# Patient Record
Sex: MALE | Race: WHITE | Employment: FULL TIME | URBAN - METROPOLITAN AREA
[De-identification: names, ages, dates, MRNs, and addresses within clinical notes are randomized per-mention and may not be internally consistent; named-entity substitution may affect disease eponyms.]

---

## 2018-05-30 ENCOUNTER — HOSPITAL ENCOUNTER (OUTPATIENT)
Age: 48
Discharge: HOME | End: 2018-05-30
Attending: FAMILY MEDICINE
Payer: COMMERCIAL

## 2018-05-30 ENCOUNTER — APPOINTMENT (OUTPATIENT)
Dept: GENERAL RADIOLOGY | Age: 48
End: 2018-05-30
Attending: FAMILY MEDICINE
Payer: COMMERCIAL

## 2018-05-30 VITALS
BODY MASS INDEX: 26.28 KG/M2 | RESPIRATION RATE: 18 BRPM | OXYGEN SATURATION: 98 % | HEART RATE: 72 BPM | WEIGHT: 194 LBS | HEIGHT: 72 IN | SYSTOLIC BLOOD PRESSURE: 146 MMHG | DIASTOLIC BLOOD PRESSURE: 84 MMHG | TEMPERATURE: 98 F

## 2018-05-30 DIAGNOSIS — M62.830 SPASM OF MUSCLE OF LOWER BACK: ICD-10-CM

## 2018-05-30 DIAGNOSIS — M54.32 SCIATICA OF LEFT SIDE: Primary | ICD-10-CM

## 2018-05-30 PROCEDURE — 96372 THER/PROPH/DIAG INJ SC/IM: CPT

## 2018-05-30 PROCEDURE — 72110 X-RAY EXAM L-2 SPINE 4/>VWS: CPT | Performed by: FAMILY MEDICINE

## 2018-05-30 PROCEDURE — 99204 OFFICE O/P NEW MOD 45 MIN: CPT

## 2018-05-30 RX ORDER — BUPRENORPHINE HYDROCHLORIDE AND NALOXONE HYDROCHLORIDE DIHYDRATE 8; 2 MG/1; MG/1
1 TABLET SUBLINGUAL DAILY
COMMUNITY
End: 2018-07-06

## 2018-05-30 RX ORDER — KETOROLAC TROMETHAMINE 30 MG/ML
60 INJECTION, SOLUTION INTRAMUSCULAR; INTRAVENOUS ONCE
Status: COMPLETED | OUTPATIENT
Start: 2018-05-30 | End: 2018-05-30

## 2018-05-30 RX ORDER — ALPRAZOLAM 0.25 MG/1
0.25 TABLET ORAL NIGHTLY PRN
COMMUNITY
End: 2019-07-26 | Stop reason: ALTCHOICE

## 2018-05-30 RX ORDER — DIAZEPAM 2 MG/1
2 TABLET ORAL ONCE
Status: COMPLETED | OUTPATIENT
Start: 2018-05-30 | End: 2018-05-30

## 2018-05-30 RX ORDER — METAXALONE 800 MG/1
800 TABLET ORAL 3 TIMES DAILY
Qty: 15 TABLET | Refills: 0 | Status: SHIPPED | OUTPATIENT
Start: 2018-05-30 | End: 2018-06-04

## 2018-05-30 RX ORDER — PREDNISONE 10 MG/1
TABLET ORAL
Qty: 30 TABLET | Refills: 0 | Status: SHIPPED | OUTPATIENT
Start: 2018-05-30 | End: 2018-06-08

## 2018-05-30 NOTE — ED INITIAL ASSESSMENT (HPI)
Denies injury pain to left lower back on & off x 6 months, now numbness to left leg, denies loss of bowel or bladder.

## 2018-05-30 NOTE — ED NOTES
Decreased pain now 4/10 but says pain increases w weight bearing. Dr Mine Sen aware. Distal pulse & cap refill remain WNL.

## 2018-05-30 NOTE — ED PROVIDER NOTES
Patient Seen in: [facility]    History   Patient presents with:  Back Pain (musculoskeletal)    Stated Complaint: back pain, leg pain x2 days    HPI  31-year-old gentleman with no prior back injury back surgery, has had april Pulse 72   Temp 97.9 °F (36.6 °C) (Temporal)   Resp 18   Ht 182.9 cm (6')   Wt 88 kg   SpO2 98%   BMI 26.31 kg/m²         Physical Exam  General: Well-nourished, well hydrated.  In mod distress associated with pain in the L lower back and buttock region rad Sig: Day 1,2: 50mg/5pills, Day 3,4: 40 mg/4 pills, Day 5,6: 30mg/3 pills, Day 7,8: 20mg/ 2 pills, Day 9,10: 10 mg/1 pill once daily          Dispense:  30 tablet          Refill:  0  Xr Lumbar Spine (min 4 Views) (cpt=72110)    Result Date: 5/30/2018  AZ disc herniation appreciated on the XR   You may need an MRI for further evaluation  You will benefit from Physical therapy after this flare subsides   Avoid any strenuous activity, lifting or pushing or pulling at this time   Avoid pressure on the L buttoc

## 2018-06-20 ENCOUNTER — HOSPITAL ENCOUNTER (OUTPATIENT)
Age: 48
Discharge: HOME | End: 2018-06-20
Attending: FAMILY MEDICINE
Payer: COMMERCIAL

## 2018-06-20 VITALS
OXYGEN SATURATION: 96 % | RESPIRATION RATE: 18 BRPM | WEIGHT: 202 LBS | HEART RATE: 80 BPM | TEMPERATURE: 98 F | BODY MASS INDEX: 27.36 KG/M2 | HEIGHT: 72 IN | DIASTOLIC BLOOD PRESSURE: 67 MMHG | SYSTOLIC BLOOD PRESSURE: 126 MMHG

## 2018-06-20 DIAGNOSIS — M54.16 LEFT LUMBAR RADICULITIS: Primary | ICD-10-CM

## 2018-06-20 PROCEDURE — 99214 OFFICE O/P EST MOD 30 MIN: CPT

## 2018-06-20 PROCEDURE — 96372 THER/PROPH/DIAG INJ SC/IM: CPT

## 2018-06-20 RX ORDER — KETOROLAC TROMETHAMINE 30 MG/ML
60 INJECTION, SOLUTION INTRAMUSCULAR; INTRAVENOUS ONCE
Status: COMPLETED | OUTPATIENT
Start: 2018-06-20 | End: 2018-06-20

## 2018-06-20 RX ORDER — CARISOPRODOL 250 MG/1
1 TABLET ORAL 3 TIMES DAILY
Qty: 10 TABLET | Refills: 0 | Status: SHIPPED | OUTPATIENT
Start: 2018-06-20 | End: 2018-06-24

## 2018-06-20 RX ORDER — PREDNISONE 10 MG/1
TABLET ORAL
Qty: 20 TABLET | Refills: 0 | Status: SHIPPED | OUTPATIENT
Start: 2018-06-20 | End: 2018-06-27 | Stop reason: ALTCHOICE

## 2018-06-20 NOTE — ED INITIAL ASSESSMENT (HPI)
C/o worsening left low back pain with radiation down left leg. Denies bowel or bladder habits. States he was here a few weeks ago, had treatment with prednisone, muscle relaxers. He reports he has not gotten relief, and feels it is worse.     Reports he

## 2018-06-21 ENCOUNTER — HOSPITAL ENCOUNTER (OUTPATIENT)
Dept: MRI IMAGING | Age: 48
Discharge: HOME | End: 2018-06-21
Attending: SPECIALIST
Payer: COMMERCIAL

## 2018-06-21 DIAGNOSIS — M54.16 LUMBAR RADICULOPATHY: ICD-10-CM

## 2018-06-21 PROCEDURE — 72148 MRI LUMBAR SPINE W/O DYE: CPT | Performed by: SPECIALIST

## 2018-06-21 NOTE — ED PROVIDER NOTES
Patient Seen in: [address] [facility]    History   Patient presents with:  Back Pain (musculoskeletal)    Stated Complaint: back pain    HPI    42-year-old male presents to the immediate care today with chief complaints of worsening atraumatic. PERRL bilaterally. Sclera clear and non icteric bilaterally. Tonsils are clear no exudates bilaterally. Moist mucous membranes. Neck: supple. No adenopathy. No thyromegaly. Heart: RRR without S3 or S4 murmur .  Clear S1S2  Lungs: clear to [employer] (three) times daily. , Print, Disp-10 tablet, R-0    predniSONE 10 MG Oral Tab  Take 3 tablets by mouth once a day for 3 days, then 2 tablets by mouth once a day for 3 days, then 1 tablet by mouth once a day for 3 days, Normal, Disp-20 tablet, R-0

## 2018-06-27 ENCOUNTER — OFFICE VISIT (OUTPATIENT)
Dept: SURGERY | Facility: CLINIC | Age: 48
End: 2018-06-27

## 2018-06-27 ENCOUNTER — TELEPHONE (OUTPATIENT)
Dept: SURGERY | Facility: CLINIC | Age: 48
End: 2018-06-27

## 2018-06-27 VITALS
SYSTOLIC BLOOD PRESSURE: 122 MMHG | WEIGHT: 211 LBS | HEART RATE: 70 BPM | DIASTOLIC BLOOD PRESSURE: 76 MMHG | BODY MASS INDEX: 28.58 KG/M2 | HEIGHT: 72 IN | OXYGEN SATURATION: 96 %

## 2018-06-27 DIAGNOSIS — M54.16 LUMBAR RADICULITIS: Primary | ICD-10-CM

## 2018-06-27 PROCEDURE — 99204 OFFICE O/P NEW MOD 45 MIN: CPT | Performed by: NURSE PRACTITIONER

## 2018-06-27 RX ORDER — ERGOCALCIFEROL 1.25 MG/1
CAPSULE ORAL
Refills: 3 | Status: ON HOLD | COMMUNITY
Start: 2018-06-15 | End: 2018-08-21

## 2018-06-27 RX ORDER — RISPERIDONE 120 MG
1 KIT SUBCUTANEOUS 2 TIMES DAILY
Refills: 0 | Status: ON HOLD | COMMUNITY
Start: 2018-06-23 | End: 2018-08-22

## 2018-06-27 RX ORDER — GABAPENTIN 400 MG/1
CAPSULE ORAL
Refills: 1 | Status: ON HOLD | COMMUNITY
Start: 2018-06-23 | End: 2018-08-21

## 2018-06-27 RX ORDER — CARISOPRODOL 350 MG/1
350 TABLET ORAL 3 TIMES DAILY PRN
Refills: 1 | COMMUNITY
End: 2019-07-26 | Stop reason: ALTCHOICE

## 2018-06-27 NOTE — PATIENT INSTRUCTIONS
Refill policies:    • Allow 2-3 business days for refills; controlled substances may take longer.   • Contact your pharmacy at least 5 days prior to running out of medication and have them send an electronic request or submit request through the “request re entire amount billed. Precertification and Prior Authorizations: If your physician has recommended that you have a procedure or additional testing performed.   [facility]) will contact your insurance carrier to obtain pre-certi not authorize routine medications on weekends. • No narcotics or controlled substances are refilled after noon on Fridays or by on call physicians. • By law, narcotics cannot be faxed or phoned into your pharmacy.  The prescription must be signed by the p the procedure/test has been pre-certified. You are strongly encouraged to contact your insurance carrier to verify that your procedure/test has been approved and is a COVERED benefit.   Although the [facility] staff does its due diligence, the insurance carrier g prior to your procedure to reduce the risk of  infection. Day of Procedure:   Do not eat or drink anything (including water) 6 hours prior to your procedure.   • If you take morning blood pressure medication or oral diabetic medication, please take wit days  • Others 7 days   NSAIDs: 24 hours    • Ibuprofen (Motrin, Advil, Vicoprofen), Naproxen (Naprosyn, Aleve), Piroxcam (Feldene), Meloxicam (Mobic), Oxaprozin (Daypro), Diclofenac (Voltaren),  Indomethacin (Indocin), Etodolac (Lodine), Nabumetone (Relaf

## 2018-06-27 NOTE — PROGRESS NOTES
HPI:    Patient ID: Johnathan Stauffer is a 56year old male.     HPI    Review of Systems         Current Outpatient Prescriptions:  SUBOXONE 8-2 MG Sublingual Film  Disp:  Rfl: 0   Carisoprodol 350 MG Oral Tab  Disp:  Rfl: 1   ergocalciferol 52123 units Oral Cap Prior diagnostic testing for your pain:  MRI

## 2018-06-27 NOTE — H&P
Name: Brittani Whitehead   : 1970   DOS: 2018     Chief complaint: Patient presents with:  New Patient: Pt. reports left sided low back, buttocks, sciatica into left leg and sometimes toes. /10 Current pain. Other: with Maricruz, ok to hear PHI. Smokeless tobacco: Never Used                      Alcohol use:  No                Review of  other systems:  Constitutional: negative for fever, weight loss and malaise/fatigue  Cardiovascular:  Denies shortness of breath, chest pain, dyspnea on exertion spine.  Marrow signal is unremarkable. The conus is at L1. The visualized portion of the spinal cord is of normal caliber without focal signal abnormality. T12-L1: No disc herniation, spinal canal or neuroforaminal stenosis.   L1-L2: No disc herniation marijuana and opioid abuse in the past with current suboxone use. If he gets some relief from the injection but still has radiating symptoms we can start him on gabapentin. 3) F/U in the office 2 weeks post procedures for re-evaluation.        Orders:No o

## 2018-07-02 ENCOUNTER — HOSPITAL ENCOUNTER (OUTPATIENT)
Facility: HOSPITAL | Age: 48
Setting detail: HOSPITAL OUTPATIENT SURGERY
Discharge: HOME | End: 2018-07-02
Attending: ANESTHESIOLOGY | Admitting: ANESTHESIOLOGY
Payer: COMMERCIAL

## 2018-07-02 ENCOUNTER — SURGERY (OUTPATIENT)
Age: 48
End: 2018-07-02

## 2018-07-02 ENCOUNTER — APPOINTMENT (OUTPATIENT)
Dept: GENERAL RADIOLOGY | Facility: HOSPITAL | Age: 48
End: 2018-07-02
Attending: ANESTHESIOLOGY
Payer: COMMERCIAL

## 2018-07-02 ENCOUNTER — TELEPHONE (OUTPATIENT)
Dept: SURGERY | Facility: CLINIC | Age: 48
End: 2018-07-02

## 2018-07-02 VITALS
OXYGEN SATURATION: 99 % | TEMPERATURE: 98 F | HEART RATE: 76 BPM | RESPIRATION RATE: 18 BRPM | SYSTOLIC BLOOD PRESSURE: 177 MMHG | DIASTOLIC BLOOD PRESSURE: 128 MMHG

## 2018-07-02 DIAGNOSIS — M54.16 LUMBAR RADICULITIS: ICD-10-CM

## 2018-07-02 PROCEDURE — 3E0R3KZ INTRODUCTION OF OTHER DIAGNOSTIC SUBSTANCE INTO SPINAL CANAL, PERCUTANEOUS APPROACH: ICD-10-PCS | Performed by: ANESTHESIOLOGY

## 2018-07-02 PROCEDURE — 3E0R33Z INTRODUCTION OF ANTI-INFLAMMATORY INTO SPINAL CANAL, PERCUTANEOUS APPROACH: ICD-10-PCS | Performed by: ANESTHESIOLOGY

## 2018-07-02 PROCEDURE — 99152 MOD SED SAME PHYS/QHP 5/>YRS: CPT | Performed by: ANESTHESIOLOGY

## 2018-07-02 RX ORDER — LIDOCAINE HYDROCHLORIDE 10 MG/ML
INJECTION, SOLUTION EPIDURAL; INFILTRATION; INTRACAUDAL; PERINEURAL AS NEEDED
Status: DISCONTINUED | OUTPATIENT
Start: 2018-07-02 | End: 2018-07-02 | Stop reason: HOSPADM

## 2018-07-02 RX ORDER — DIAZEPAM 5 MG/ML
5 INJECTION, SOLUTION INTRAMUSCULAR; INTRAVENOUS ONCE
Status: DISCONTINUED | OUTPATIENT
Start: 2018-07-02 | End: 2018-07-02

## 2018-07-02 RX ORDER — DIPHENHYDRAMINE HYDROCHLORIDE 50 MG/ML
50 INJECTION INTRAMUSCULAR; INTRAVENOUS ONCE AS NEEDED
Status: CANCELLED | OUTPATIENT
Start: 2018-07-02 | End: 2018-07-02

## 2018-07-02 RX ORDER — DEXAMETHASONE SODIUM PHOSPHATE 10 MG/ML
INJECTION, SOLUTION INTRAMUSCULAR; INTRAVENOUS AS NEEDED
Status: DISCONTINUED | OUTPATIENT
Start: 2018-07-02 | End: 2018-07-02 | Stop reason: HOSPADM

## 2018-07-02 RX ORDER — SODIUM CHLORIDE, SODIUM LACTATE, POTASSIUM CHLORIDE, CALCIUM CHLORIDE 600; 310; 30; 20 MG/100ML; MG/100ML; MG/100ML; MG/100ML
100 INJECTION, SOLUTION INTRAVENOUS CONTINUOUS
Status: DISCONTINUED | OUTPATIENT
Start: 2018-07-02 | End: 2018-07-02

## 2018-07-02 RX ORDER — ACETAMINOPHEN 500 MG
TABLET ORAL
Status: COMPLETED
Start: 2018-07-02 | End: 2018-07-02

## 2018-07-02 RX ORDER — ONDANSETRON 2 MG/ML
4 INJECTION INTRAMUSCULAR; INTRAVENOUS ONCE AS NEEDED
Status: CANCELLED | OUTPATIENT
Start: 2018-07-02 | End: 2018-07-02

## 2018-07-02 RX ORDER — DIAZEPAM 5 MG/1
TABLET ORAL
Status: DISCONTINUED
Start: 2018-07-02 | End: 2018-07-02 | Stop reason: WASHOUT

## 2018-07-02 RX ORDER — 0.9 % SODIUM CHLORIDE 0.9 %
VIAL (ML) INJECTION AS NEEDED
Status: DISCONTINUED | OUTPATIENT
Start: 2018-07-02 | End: 2018-07-02 | Stop reason: HOSPADM

## 2018-07-02 RX ORDER — MIDAZOLAM HYDROCHLORIDE 1 MG/ML
INJECTION INTRAMUSCULAR; INTRAVENOUS AS NEEDED
Status: DISCONTINUED | OUTPATIENT
Start: 2018-07-02 | End: 2018-07-02 | Stop reason: HOSPADM

## 2018-07-02 NOTE — H&P
History & Physical Examination    Patient Name: Matt Torres  MRN: [id number]  CSN: [id number]  YOB: 1970    Pre-Operative Diagnosis:  Lumbar radiculitis [M54.16]    Present Illness: Patient with right lumbar radiculopathy here for transforamin days.  Any changes noted above.     Margareth Meng MD

## 2018-07-02 NOTE — OPERATIVE REPORT
[facility]  Operative Report  2018     Kiko Coleman Patient Status:  Hospital Outpatient Surgery    1970 MRN [id number]   [facility] SURGERY Attending Freida Barney MD   Hosp Day # 0 PCP Leeanna Mccarty MD     Indication: Quita Morales is epidural space at this level. The needle position was confirmed under AP and lateral fluoroscopic view. Following negative aspiration for CSF and blood, approximately 1 cc of Omnipaque 240 was injected.   An excellent contrast spread along the epidural spa

## 2018-07-06 ENCOUNTER — OFFICE VISIT (OUTPATIENT)
Dept: SURGERY | Facility: CLINIC | Age: 48
End: 2018-07-06

## 2018-07-06 VITALS
HEIGHT: 72 IN | WEIGHT: 210 LBS | DIASTOLIC BLOOD PRESSURE: 80 MMHG | BODY MASS INDEX: 28.44 KG/M2 | SYSTOLIC BLOOD PRESSURE: 120 MMHG | HEART RATE: 88 BPM

## 2018-07-06 DIAGNOSIS — M48.061 LUMBAR FORAMINAL STENOSIS: ICD-10-CM

## 2018-07-06 DIAGNOSIS — M54.16 LUMBAR RADICULOPATHY: Primary | ICD-10-CM

## 2018-07-06 PROCEDURE — 99204 OFFICE O/P NEW MOD 45 MIN: CPT | Performed by: PHYSICIAN ASSISTANT

## 2018-07-06 NOTE — PROGRESS NOTES
Location of Pain: left sided low back, buttocks, sciatica into left leg and sometimes toes.      Date Pain Began: May 30 2018          Work Related:   No        Receiving Work Comp/Disability:   No    Numeric Rating Scale:  Pain at Present:  7

## 2018-07-06 NOTE — H&P
Neurosurgery Clinic Visit  2018    Dilma Jones PCP:  Corrine Davidson MD    1970 MRN [id number]       CC:  Left Leg Pain    HPI:    Jasmeet Gallardo is a very pleasant 56year old male who presents with 2 months of left leg pain.   He woke up with the pain, n spine.  Marginal osteophytes are noted. Mild facet hypertrophic changes are noted. No past medical history on file.   Social History    Marital status: Single              Spouse name:                       Years of education:                 Number of Lower extremity strength:    Iliopsoas Quad Hamstring D-Flexion EHL P-Flexion Eversion Inversion   Right 5 5 5 5 5 5 5 5   Left 5 5* 5 5 5 5 5 5     *Difficulty at first, but able to give good resistance through the pain when prompted    A/P:    1.  Veronica Shepard

## 2018-07-10 ENCOUNTER — HOSPITAL ENCOUNTER (OUTPATIENT)
Dept: GENERAL RADIOLOGY | Age: 48
Discharge: HOME | End: 2018-07-10
Attending: PHYSICIAN ASSISTANT
Payer: COMMERCIAL

## 2018-07-10 DIAGNOSIS — M48.061 LUMBAR FORAMINAL STENOSIS: ICD-10-CM

## 2018-07-10 DIAGNOSIS — M54.16 LUMBAR RADICULOPATHY: ICD-10-CM

## 2018-07-10 PROCEDURE — 72114 X-RAY EXAM L-S SPINE BENDING: CPT | Performed by: PHYSICIAN ASSISTANT

## 2018-07-11 ENCOUNTER — TELEPHONE (OUTPATIENT)
Dept: SURGERY | Facility: CLINIC | Age: 48
End: 2018-07-11

## 2018-07-11 NOTE — TELEPHONE ENCOUNTER
Left message for patient, confirmed procedure date of 07/16 and to be checked in at outpatient registration at 8:45 am. Patient instructed to call pre-procedure line before procedure at [phone].  Patient instructed to call office if there are additiona

## 2018-07-16 ENCOUNTER — SURGERY (OUTPATIENT)
Age: 48
End: 2018-07-16

## 2018-07-16 ENCOUNTER — HOSPITAL ENCOUNTER (OUTPATIENT)
Facility: HOSPITAL | Age: 48
Setting detail: HOSPITAL OUTPATIENT SURGERY
Discharge: HOME | End: 2018-07-16
Attending: ANESTHESIOLOGY | Admitting: ANESTHESIOLOGY
Payer: COMMERCIAL

## 2018-07-16 ENCOUNTER — APPOINTMENT (OUTPATIENT)
Dept: GENERAL RADIOLOGY | Facility: HOSPITAL | Age: 48
End: 2018-07-16
Attending: ANESTHESIOLOGY
Payer: COMMERCIAL

## 2018-07-16 VITALS
SYSTOLIC BLOOD PRESSURE: 137 MMHG | OXYGEN SATURATION: 95 % | TEMPERATURE: 98 F | RESPIRATION RATE: 18 BRPM | HEART RATE: 66 BPM | DIASTOLIC BLOOD PRESSURE: 88 MMHG

## 2018-07-16 DIAGNOSIS — M54.16 LUMBAR RADICULITIS: ICD-10-CM

## 2018-07-16 PROCEDURE — 3E0R33Z INTRODUCTION OF ANTI-INFLAMMATORY INTO SPINAL CANAL, PERCUTANEOUS APPROACH: ICD-10-PCS | Performed by: ANESTHESIOLOGY

## 2018-07-16 PROCEDURE — 99152 MOD SED SAME PHYS/QHP 5/>YRS: CPT | Performed by: ANESTHESIOLOGY

## 2018-07-16 RX ORDER — MIDAZOLAM HYDROCHLORIDE 1 MG/ML
INJECTION INTRAMUSCULAR; INTRAVENOUS AS NEEDED
Status: DISCONTINUED | OUTPATIENT
Start: 2018-07-16 | End: 2018-07-16 | Stop reason: HOSPADM

## 2018-07-16 RX ORDER — DEXAMETHASONE SODIUM PHOSPHATE 10 MG/ML
INJECTION, SOLUTION INTRAMUSCULAR; INTRAVENOUS AS NEEDED
Status: DISCONTINUED | OUTPATIENT
Start: 2018-07-16 | End: 2018-07-16 | Stop reason: HOSPADM

## 2018-07-16 RX ORDER — ONDANSETRON 2 MG/ML
4 INJECTION INTRAMUSCULAR; INTRAVENOUS ONCE AS NEEDED
Status: DISCONTINUED | OUTPATIENT
Start: 2018-07-16 | End: 2018-07-16

## 2018-07-16 RX ORDER — LIDOCAINE HYDROCHLORIDE 10 MG/ML
INJECTION, SOLUTION EPIDURAL; INFILTRATION; INTRACAUDAL; PERINEURAL AS NEEDED
Status: DISCONTINUED | OUTPATIENT
Start: 2018-07-16 | End: 2018-07-16 | Stop reason: HOSPADM

## 2018-07-16 RX ORDER — DIPHENHYDRAMINE HYDROCHLORIDE 50 MG/ML
50 INJECTION INTRAMUSCULAR; INTRAVENOUS ONCE AS NEEDED
Status: DISCONTINUED | OUTPATIENT
Start: 2018-07-16 | End: 2018-07-16

## 2018-07-16 RX ORDER — 0.9 % SODIUM CHLORIDE 0.9 %
VIAL (ML) INJECTION AS NEEDED
Status: DISCONTINUED | OUTPATIENT
Start: 2018-07-16 | End: 2018-07-16 | Stop reason: HOSPADM

## 2018-07-16 RX ORDER — SODIUM CHLORIDE, SODIUM LACTATE, POTASSIUM CHLORIDE, CALCIUM CHLORIDE 600; 310; 30; 20 MG/100ML; MG/100ML; MG/100ML; MG/100ML
100 INJECTION, SOLUTION INTRAVENOUS CONTINUOUS
Status: DISCONTINUED | OUTPATIENT
Start: 2018-07-16 | End: 2018-07-16

## 2018-07-16 NOTE — OPERATIVE REPORT
[facility]  Operative Report  2018     Dilma Jones Patient Status:  Hospital Outpatient Surgery    1970 MRN [id number]   [facility] SURGERY Attending Timothy Luo MD   Hosp Day # 0 PCP Corrine Davidson MD     Indication: Ruiz Perez epidural space at this level. The needle position was confirmed under AP and lateral fluoroscopic view. Following negative aspiration for CSF and blood, approximately 1 cc of Omnipaque 240 was injected.   An excellent contrast spread along the epidural spa

## 2018-07-16 NOTE — H&P
History & Physical Examination    Patient Name: Dewayne Rodriguez  MRN: [id number]  CSN: [id number]  YOB: 1970    Pre-Operative Diagnosis:  Lumbar radiculitis [M54.16]    Present Illness: Lumbar radiculopathy here for transforaminal epidural steroid plan of care. [ x ] I have reviewed the History and Physical done within the last 30 days. Any changes noted above.     Federica Townsend MD

## 2018-07-25 ENCOUNTER — TELEPHONE (OUTPATIENT)
Dept: SURGERY | Facility: CLINIC | Age: 48
End: 2018-07-25

## 2018-07-25 NOTE — TELEPHONE ENCOUNTER
Spoke to patient, confirmed procedure date of 7/30/18 and to be checked in at outpatient registration at 8:30 am. Patient instructed to call pre-procedure line before procedure at [phone].  Patient instructed to call office if there are additional ques

## 2018-07-30 ENCOUNTER — TELEPHONE (OUTPATIENT)
Dept: PAIN CLINIC | Facility: CLINIC | Age: 48
End: 2018-07-30

## 2018-08-01 ENCOUNTER — OFFICE VISIT (OUTPATIENT)
Dept: SURGERY | Facility: CLINIC | Age: 48
End: 2018-08-01

## 2018-08-01 ENCOUNTER — TELEPHONE (OUTPATIENT)
Dept: SURGERY | Facility: CLINIC | Age: 48
End: 2018-08-01

## 2018-08-01 VITALS — DIASTOLIC BLOOD PRESSURE: 92 MMHG | SYSTOLIC BLOOD PRESSURE: 152 MMHG | RESPIRATION RATE: 18 BRPM | HEART RATE: 68 BPM

## 2018-08-01 DIAGNOSIS — M54.16 LUMBAR RADICULOPATHY: Primary | ICD-10-CM

## 2018-08-01 PROCEDURE — 99213 OFFICE O/P EST LOW 20 MIN: CPT | Performed by: NEUROLOGICAL SURGERY

## 2018-08-01 RX ORDER — DICLOFENAC SODIUM 75 MG/1
1 TABLET, DELAYED RELEASE ORAL 2 TIMES DAILY
Refills: 3 | COMMUNITY
Start: 2018-07-23

## 2018-08-01 NOTE — TELEPHONE ENCOUNTER
You are scheduled for Left Lumbar 4 foraminotomy on 8/21/18 with .     Pre-op instructions discussed with patient and surgical packet provided:     · You will need to contact the Pre-admission department at [phone].  You will speak with a [employer] before you are discharged from the hospital  · Post operative appointment to be made 2 weeks and 6 weeks after surgery. When speaking with Pre-admissions you will be told about a spine class as it relates to your surgery.  Although the class is not jacob

## 2018-08-01 NOTE — TELEPHONE ENCOUNTER
Patient was seen in the office today by Ofelia Mcdonnell. Per Ofelia Mcdonnell he would like patient to be scheduled for a piriformis injections ASAP prior to surgery that is currently scheduled with  for 8/21/18.     Please discuss with  and contact p

## 2018-08-01 NOTE — PROGRESS NOTES
[facility]  Neurosurgery Clinic Visit    HISTORY OF PRESENT ILLNESS:  Sandy Abraham is a(n) 56year old male previously seen in clinic for left leg pain, L L4-5 foraminal stenosis on MRI.   Pain is persistent and severe, limits his activit is intact. Tongue is midline.        Motor: 5/5 BLE   Sensation: intact to light touch bilaterally     Reflexes: 1+, no clonus, no campa's   Coordination: deferred   Gait: deferred  TTP over mid left gluteal region, deep palpation increases the pain

## 2018-08-01 NOTE — PATIENT INSTRUCTIONS
Refill policies:    • Allow 2-3 business days for refills; controlled substances may take longer.   • Contact your pharmacy at least 5 days prior to running out of medication and have them send an electronic request or submit request through the “request re entire amount billed. Precertification and Prior Authorizations: If your physician has recommended that you have a procedure or additional testing performed.   [facility]) will contact your insurance carrier to obtain pre-certi get you scheduled for all your pre-op testing required for your surgery. · You will need  pre operative labs which will include MRSA/MSSA testing. If positive you  will be contacted by our office with further instructions.    · No blood thinning medicatio Pre-op Spine Surgery Class is held at [facility] most Wednesdays from 4:00-5:00 pm.  Call Pre-admission Testing (PAT) to register at:  [phone].     Please park in the [facility] garage, check in at registration and meet by the fish tank in the

## 2018-08-01 NOTE — PROGRESS NOTES
Patient f/u after completing xray and MRI's. Injections 1st injection did not help and 2nd injection helped about 30% for 2-3 days.   No PT.

## 2018-08-01 NOTE — TELEPHONE ENCOUNTER
LM on personalized VM informing patient letter can be sent via [employer]. If he still needs a copy informed patient to call our office back. Informed patient letter is not ready yet as Nancy Duvall still needs to review and sign the letter.     Letter pended an

## 2018-08-02 ENCOUNTER — TELEPHONE (OUTPATIENT)
Dept: PAIN CLINIC | Facility: CLINIC | Age: 48
End: 2018-08-02

## 2018-08-02 DIAGNOSIS — M54.16 LUMBAR RADICULOPATHY: Primary | ICD-10-CM

## 2018-08-02 DIAGNOSIS — M79.18 PIRIFORMIS MUSCLE PAIN: ICD-10-CM

## 2018-08-02 NOTE — TELEPHONE ENCOUNTER
55 yo M with persistent L leg pain, L buttock/hip pain with TTP, L L4 foraminal stenosis.   This is likely due to foraminal stenosis, however, he has findings concerning for pyriformis syndrome as well.       PLAN:  -pyriformis injection with Dr. Jazmin Prasadpen

## 2018-08-02 NOTE — TELEPHONE ENCOUNTER
Spoke with patient and scheduled injection. 8/13 with a check in time of 1430. Reviewed pre-op instructions. Patient verbalized understanding, no further questions at this time. Pre-op instructions sent via [employer].

## 2018-08-02 NOTE — TELEPHONE ENCOUNTER
Periformis injection scheduled. 8/13/18 with a 2:30 check in time. see TE from DR. Betancourt Standing 8/1/18

## 2018-08-13 ENCOUNTER — HOSPITAL ENCOUNTER (OUTPATIENT)
Facility: HOSPITAL | Age: 48
Setting detail: HOSPITAL OUTPATIENT SURGERY
Discharge: HOME | End: 2018-08-13
Attending: ANESTHESIOLOGY | Admitting: ANESTHESIOLOGY
Payer: COMMERCIAL

## 2018-08-13 ENCOUNTER — APPOINTMENT (OUTPATIENT)
Dept: GENERAL RADIOLOGY | Facility: HOSPITAL | Age: 48
End: 2018-08-13
Attending: ANESTHESIOLOGY
Payer: COMMERCIAL

## 2018-08-13 ENCOUNTER — SURGERY (OUTPATIENT)
Age: 48
End: 2018-08-13

## 2018-08-13 VITALS
SYSTOLIC BLOOD PRESSURE: 137 MMHG | RESPIRATION RATE: 18 BRPM | OXYGEN SATURATION: 100 % | TEMPERATURE: 99 F | HEART RATE: 70 BPM | DIASTOLIC BLOOD PRESSURE: 72 MMHG

## 2018-08-13 DIAGNOSIS — M79.18 PIRIFORMIS MUSCLE PAIN: ICD-10-CM

## 2018-08-13 DIAGNOSIS — M54.16 LUMBAR RADICULOPATHY: ICD-10-CM

## 2018-08-13 PROCEDURE — 3E023BZ INTRODUCTION OF ANESTHETIC AGENT INTO MUSCLE, PERCUTANEOUS APPROACH: ICD-10-PCS | Performed by: ANESTHESIOLOGY

## 2018-08-13 PROCEDURE — 3E0233Z INTRODUCTION OF ANTI-INFLAMMATORY INTO MUSCLE, PERCUTANEOUS APPROACH: ICD-10-PCS | Performed by: ANESTHESIOLOGY

## 2018-08-13 PROCEDURE — 77002 NEEDLE LOCALIZATION BY XRAY: CPT | Performed by: ANESTHESIOLOGY

## 2018-08-13 RX ORDER — DIPHENHYDRAMINE HYDROCHLORIDE 50 MG/ML
50 INJECTION INTRAMUSCULAR; INTRAVENOUS ONCE AS NEEDED
Status: DISCONTINUED | OUTPATIENT
Start: 2018-08-13 | End: 2018-08-13

## 2018-08-13 RX ORDER — ONDANSETRON 2 MG/ML
4 INJECTION INTRAMUSCULAR; INTRAVENOUS ONCE AS NEEDED
Status: DISCONTINUED | OUTPATIENT
Start: 2018-08-13 | End: 2018-08-13

## 2018-08-13 RX ORDER — ONDANSETRON 2 MG/ML
4 INJECTION INTRAMUSCULAR; INTRAVENOUS ONCE AS NEEDED
Status: CANCELLED | OUTPATIENT
Start: 2018-08-13 | End: 2018-08-13

## 2018-08-13 RX ORDER — LIDOCAINE HYDROCHLORIDE 10 MG/ML
INJECTION, SOLUTION EPIDURAL; INFILTRATION; INTRACAUDAL; PERINEURAL AS NEEDED
Status: DISCONTINUED | OUTPATIENT
Start: 2018-08-13 | End: 2018-08-13 | Stop reason: HOSPADM

## 2018-08-13 RX ORDER — SODIUM CHLORIDE, SODIUM LACTATE, POTASSIUM CHLORIDE, CALCIUM CHLORIDE 600; 310; 30; 20 MG/100ML; MG/100ML; MG/100ML; MG/100ML
100 INJECTION, SOLUTION INTRAVENOUS CONTINUOUS
Status: CANCELLED | OUTPATIENT
Start: 2018-08-13

## 2018-08-13 RX ORDER — METHYLPREDNISOLONE ACETATE 40 MG/ML
INJECTION, SUSPENSION INTRA-ARTICULAR; INTRALESIONAL; INTRAMUSCULAR; SOFT TISSUE AS NEEDED
Status: DISCONTINUED | OUTPATIENT
Start: 2018-08-13 | End: 2018-08-13 | Stop reason: HOSPADM

## 2018-08-13 NOTE — H&P
History & Physical Examination    Patient Name: Rajat Felix  MRN: [id number]  CSN: [id number]  YOB: 1970    Pre-Operative Diagnosis:  Lumbar radiculopathy [M54.16]  Piriformis muscle pain [M79.1]    Present Illness: With piriformis muscle pain tobacco: Never Used    Alcohol use No       SYSTEM Check if Review is Normal Check if Physical Exam is Normal If not normal, please explain:   HEENT [x ] [x ]    NECK & BACK [x ] [x ]    HEART [x ] [x ]    LUNGS [x ] [x ]    ABDOMEN [x ] [x ]    UROGENITAL

## 2018-08-15 NOTE — TELEPHONE ENCOUNTER
Patient states he has PCP appointment today at 3:00pm and will be having his labs done through PCP office. He also wants Dr. Fabiana Oquendo to know that he did not get any relief of pain with injection done by Dr. Milli Adams.

## 2018-08-17 ENCOUNTER — LAB ENCOUNTER (OUTPATIENT)
Dept: LAB | Facility: HOSPITAL | Age: 48
End: 2018-08-17
Attending: SPECIALIST
Payer: COMMERCIAL

## 2018-08-17 ENCOUNTER — HOSPITAL ENCOUNTER (OUTPATIENT)
Dept: GENERAL RADIOLOGY | Facility: HOSPITAL | Age: 48
Discharge: HOME | End: 2018-08-17
Attending: SPECIALIST
Payer: COMMERCIAL

## 2018-08-17 DIAGNOSIS — Z01.818 PRE-OP EVALUATION: Primary | ICD-10-CM

## 2018-08-17 DIAGNOSIS — Z01.811 PREOP RESPIRATORY EXAM: ICD-10-CM

## 2018-08-17 LAB
ALBUMIN SERPL-MCNC: 4 G/DL (ref 3.5–4.8)
ALBUMIN/GLOB SERPL: 1.1 {RATIO} (ref 1–2)
ALP LIVER SERPL-CCNC: 44 U/L (ref 45–117)
ALT SERPL-CCNC: 20 U/L (ref 17–63)
ANION GAP SERPL CALC-SCNC: 9 MMOL/L (ref 0–18)
APTT PPP: 28 SECONDS (ref 26.1–34.6)
AST SERPL-CCNC: 10 U/L (ref 15–41)
BASOPHILS # BLD AUTO: 0.07 X10(3) UL (ref 0–0.1)
BASOPHILS NFR BLD AUTO: 0.7 %
BILIRUB SERPL-MCNC: 0.5 MG/DL (ref 0.1–2)
BUN BLD-MCNC: 17 MG/DL (ref 8–20)
BUN/CREAT SERPL: 14.3 (ref 10–20)
CALCIUM BLD-MCNC: 9.1 MG/DL (ref 8.3–10.3)
CHLORIDE SERPL-SCNC: 102 MMOL/L (ref 101–111)
CO2 SERPL-SCNC: 27 MMOL/L (ref 22–32)
CREAT BLD-MCNC: 1.19 MG/DL (ref 0.7–1.3)
EOSINOPHIL # BLD AUTO: 0.16 X10(3) UL (ref 0–0.3)
EOSINOPHIL NFR BLD AUTO: 1.6 %
ERYTHROCYTE [DISTWIDTH] IN BLOOD BY AUTOMATED COUNT: 12.6 % (ref 11.5–16)
GLOBULIN PLAS-MCNC: 3.6 G/DL (ref 2.5–4)
GLUCOSE BLD-MCNC: 95 MG/DL (ref 70–99)
HCT VFR BLD AUTO: 47.4 % (ref 37–53)
HGB BLD-MCNC: 16.2 G/DL (ref 13–17)
IMMATURE GRANULOCYTE COUNT: 0.04 X10(3) UL (ref 0–1)
IMMATURE GRANULOCYTE RATIO %: 0.4 %
INR BLD: 1.03 (ref 0.9–1.1)
LYMPHOCYTES # BLD AUTO: 3.58 X10(3) UL (ref 0.9–4)
LYMPHOCYTES NFR BLD AUTO: 35 %
M PROTEIN MFR SERPL ELPH: 7.6 G/DL (ref 6.1–8.3)
MCH RBC QN AUTO: 31.9 PG (ref 27–33.2)
MCHC RBC AUTO-ENTMCNC: 34.2 G/DL (ref 31–37)
MCV RBC AUTO: 93.3 FL (ref 80–99)
MONOCYTES # BLD AUTO: 0.78 X10(3) UL (ref 0.1–1)
MONOCYTES NFR BLD AUTO: 7.6 %
NEUTROPHIL ABS PRELIM: 5.59 X10 (3) UL (ref 1.3–6.7)
NEUTROPHILS # BLD AUTO: 5.59 X10(3) UL (ref 1.3–6.7)
NEUTROPHILS NFR BLD AUTO: 54.7 %
OSMOLALITY SERPL CALC.SUM OF ELEC: 287 MOSM/KG (ref 275–295)
PLATELET # BLD AUTO: 325 10(3)UL (ref 150–450)
POTASSIUM SERPL-SCNC: 4.4 MMOL/L (ref 3.6–5.1)
PSA SERPL DL<=0.01 NG/ML-MCNC: 13.9 SECONDS (ref 12.4–14.7)
RBC # BLD AUTO: 5.08 X10(6)UL (ref 4.3–5.7)
RED CELL DISTRIBUTION WIDTH-SD: 43.8 FL (ref 35.1–46.3)
SODIUM SERPL-SCNC: 138 MMOL/L (ref 136–144)
WBC # BLD AUTO: 10.2 X10(3) UL (ref 4–13)

## 2018-08-17 PROCEDURE — 87081 CULTURE SCREEN ONLY: CPT

## 2018-08-17 PROCEDURE — 85610 PROTHROMBIN TIME: CPT

## 2018-08-17 PROCEDURE — 71046 X-RAY EXAM CHEST 2 VIEWS: CPT | Performed by: SPECIALIST

## 2018-08-17 PROCEDURE — 85025 COMPLETE CBC W/AUTO DIFF WBC: CPT

## 2018-08-17 PROCEDURE — 36415 COLL VENOUS BLD VENIPUNCTURE: CPT

## 2018-08-17 PROCEDURE — 85730 THROMBOPLASTIN TIME PARTIAL: CPT

## 2018-08-17 PROCEDURE — 80053 COMPREHEN METABOLIC PANEL: CPT

## 2018-08-17 NOTE — TELEPHONE ENCOUNTER
Dr. Marleni Mann office closed, transferred to [facility] office. Left message for Dr. Billy Ferrara to place plan for Suboxone and pain management in notes from patient's office visit today.  Message will be relayed when he arrives in [facility] office later

## 2018-08-17 NOTE — TELEPHONE ENCOUNTER
Spoke with patient, he had injection done with no relief of his pain. He is scheduled to have surgery on 8/21/18. Patient states he is tapering off Suboxone and should be off it by [employer].  He will be seeing Dr. Waleska Muller today, who is managing his S

## 2018-08-20 NOTE — TELEPHONE ENCOUNTER
Spoke with Dr. James Pagan office, they will speak with PCP and have medical clearance faxed to our office.

## 2018-08-21 ENCOUNTER — ANESTHESIA EVENT (OUTPATIENT)
Dept: SURGERY | Facility: HOSPITAL | Age: 48
End: 2018-08-21
Payer: COMMERCIAL

## 2018-08-21 ENCOUNTER — HOSPITAL ENCOUNTER (OUTPATIENT)
Facility: HOSPITAL | Age: 48
Setting detail: OBSERVATION
Discharge: HOME | End: 2018-08-22
Attending: NEUROLOGICAL SURGERY | Admitting: NEUROLOGICAL SURGERY
Payer: COMMERCIAL

## 2018-08-21 ENCOUNTER — APPOINTMENT (OUTPATIENT)
Dept: GENERAL RADIOLOGY | Facility: HOSPITAL | Age: 48
End: 2018-08-21
Attending: NEUROLOGICAL SURGERY
Payer: COMMERCIAL

## 2018-08-21 ENCOUNTER — ANESTHESIA (OUTPATIENT)
Dept: SURGERY | Facility: HOSPITAL | Age: 48
End: 2018-08-21
Payer: COMMERCIAL

## 2018-08-21 ENCOUNTER — SURGERY (OUTPATIENT)
Age: 48
End: 2018-08-21

## 2018-08-21 DIAGNOSIS — M54.16 LUMBAR RADICULOPATHY: ICD-10-CM

## 2018-08-21 DIAGNOSIS — M48.061 LUMBAR FORAMINAL STENOSIS: Primary | ICD-10-CM

## 2018-08-21 PROCEDURE — 01NB0ZZ RELEASE LUMBAR NERVE, OPEN APPROACH: ICD-10-PCS | Performed by: NEUROLOGICAL SURGERY

## 2018-08-21 PROCEDURE — 76001 XR FLUOROSCOPE EXAM >1 HR EXTENSIVE (CPT=76001): CPT | Performed by: NEUROLOGICAL SURGERY

## 2018-08-21 RX ORDER — NICOTINE 21 MG/24HR
1 PATCH, TRANSDERMAL 24 HOURS TRANSDERMAL DAILY
Status: DISCONTINUED | OUTPATIENT
Start: 2018-08-21 | End: 2018-08-22

## 2018-08-21 RX ORDER — HYDROCODONE BITARTRATE AND ACETAMINOPHEN 5; 325 MG/1; MG/1
1 TABLET ORAL EVERY 4 HOURS PRN
Status: DISCONTINUED | OUTPATIENT
Start: 2018-08-21 | End: 2018-08-22

## 2018-08-21 RX ORDER — SODIUM PHOSPHATE, DIBASIC AND SODIUM PHOSPHATE, MONOBASIC 7; 19 G/133ML; G/133ML
1 ENEMA RECTAL ONCE AS NEEDED
Status: DISCONTINUED | OUTPATIENT
Start: 2018-08-21 | End: 2018-08-22

## 2018-08-21 RX ORDER — TIZANIDINE 4 MG/1
4 TABLET ORAL EVERY 6 HOURS PRN
Status: DISCONTINUED | OUTPATIENT
Start: 2018-08-21 | End: 2018-08-22

## 2018-08-21 RX ORDER — METOCLOPRAMIDE HYDROCHLORIDE 5 MG/ML
10 INJECTION INTRAMUSCULAR; INTRAVENOUS EVERY 6 HOURS PRN
Status: DISCONTINUED | OUTPATIENT
Start: 2018-08-21 | End: 2018-08-22

## 2018-08-21 RX ORDER — CYCLOBENZAPRINE HCL 10 MG
10 TABLET ORAL 3 TIMES DAILY PRN
Status: DISCONTINUED | OUTPATIENT
Start: 2018-08-21 | End: 2018-08-21

## 2018-08-21 RX ORDER — BACITRACIN 50000 [USP'U]/1
INJECTION, POWDER, LYOPHILIZED, FOR SOLUTION INTRAMUSCULAR AS NEEDED
Status: DISCONTINUED | OUTPATIENT
Start: 2018-08-21 | End: 2018-08-21 | Stop reason: HOSPADM

## 2018-08-21 RX ORDER — SENNOSIDES 8.6 MG
17.2 TABLET ORAL NIGHTLY
Status: DISCONTINUED | OUTPATIENT
Start: 2018-08-21 | End: 2018-08-22

## 2018-08-21 RX ORDER — BUPIVACAINE HYDROCHLORIDE AND EPINEPHRINE 5; 5 MG/ML; UG/ML
INJECTION, SOLUTION EPIDURAL; INTRACAUDAL; PERINEURAL AS NEEDED
Status: DISCONTINUED | OUTPATIENT
Start: 2018-08-21 | End: 2018-08-21 | Stop reason: HOSPADM

## 2018-08-21 RX ORDER — SODIUM CHLORIDE, SODIUM LACTATE, POTASSIUM CHLORIDE, CALCIUM CHLORIDE 600; 310; 30; 20 MG/100ML; MG/100ML; MG/100ML; MG/100ML
INJECTION, SOLUTION INTRAVENOUS CONTINUOUS
Status: DISCONTINUED | OUTPATIENT
Start: 2018-08-21 | End: 2018-08-21 | Stop reason: HOSPADM

## 2018-08-21 RX ORDER — NALOXONE HYDROCHLORIDE 0.4 MG/ML
80 INJECTION, SOLUTION INTRAMUSCULAR; INTRAVENOUS; SUBCUTANEOUS AS NEEDED
Status: DISCONTINUED | OUTPATIENT
Start: 2018-08-21 | End: 2018-08-21 | Stop reason: HOSPADM

## 2018-08-21 RX ORDER — MIDAZOLAM HYDROCHLORIDE 1 MG/ML
1 INJECTION INTRAMUSCULAR; INTRAVENOUS EVERY 5 MIN PRN
Status: DISCONTINUED | OUTPATIENT
Start: 2018-08-21 | End: 2018-08-21 | Stop reason: HOSPADM

## 2018-08-21 RX ORDER — GABAPENTIN 400 MG/1
400 CAPSULE ORAL 3 TIMES DAILY
COMMUNITY

## 2018-08-21 RX ORDER — ONDANSETRON 2 MG/ML
4 INJECTION INTRAMUSCULAR; INTRAVENOUS EVERY 4 HOURS PRN
Status: DISCONTINUED | OUTPATIENT
Start: 2018-08-21 | End: 2018-08-22

## 2018-08-21 RX ORDER — CEFAZOLIN SODIUM/WATER 2 G/20 ML
2 SYRINGE (ML) INTRAVENOUS ONCE
Status: COMPLETED | OUTPATIENT
Start: 2018-08-21 | End: 2018-08-21

## 2018-08-21 RX ORDER — ERGOCALCIFEROL (VITAMIN D2) 1250 MCG
50000 CAPSULE ORAL WEEKLY
COMMUNITY

## 2018-08-21 RX ORDER — ACETAMINOPHEN 500 MG
1000 TABLET ORAL ONCE
Status: DISCONTINUED | OUTPATIENT
Start: 2018-08-21 | End: 2018-08-22

## 2018-08-21 RX ORDER — DIPHENHYDRAMINE HYDROCHLORIDE 50 MG/ML
25 INJECTION INTRAMUSCULAR; INTRAVENOUS EVERY 4 HOURS PRN
Status: DISCONTINUED | OUTPATIENT
Start: 2018-08-21 | End: 2018-08-22

## 2018-08-21 RX ORDER — GABAPENTIN 400 MG/1
400 CAPSULE ORAL 3 TIMES DAILY
Status: DISCONTINUED | OUTPATIENT
Start: 2018-08-21 | End: 2018-08-22

## 2018-08-21 RX ORDER — DOCUSATE SODIUM 100 MG/1
100 CAPSULE, LIQUID FILLED ORAL 2 TIMES DAILY
Status: DISCONTINUED | OUTPATIENT
Start: 2018-08-21 | End: 2018-08-22

## 2018-08-21 RX ORDER — DEXAMETHASONE SODIUM PHOSPHATE 4 MG/ML
4 VIAL (ML) INJECTION AS NEEDED
Status: DISCONTINUED | OUTPATIENT
Start: 2018-08-21 | End: 2018-08-21 | Stop reason: HOSPADM

## 2018-08-21 RX ORDER — CEFAZOLIN SODIUM/WATER 2 G/20 ML
2 SYRINGE (ML) INTRAVENOUS EVERY 8 HOURS
Status: COMPLETED | OUTPATIENT
Start: 2018-08-21 | End: 2018-08-22

## 2018-08-21 RX ORDER — MORPHINE SULFATE 4 MG/ML
2 INJECTION, SOLUTION INTRAMUSCULAR; INTRAVENOUS EVERY 5 MIN PRN
Status: DISCONTINUED | OUTPATIENT
Start: 2018-08-21 | End: 2018-08-21 | Stop reason: HOSPADM

## 2018-08-21 RX ORDER — NALOXONE HYDROCHLORIDE 0.4 MG/ML
0.08 INJECTION, SOLUTION INTRAMUSCULAR; INTRAVENOUS; SUBCUTANEOUS
Status: DISCONTINUED | OUTPATIENT
Start: 2018-08-21 | End: 2018-08-22

## 2018-08-21 RX ORDER — ONDANSETRON 2 MG/ML
4 INJECTION INTRAMUSCULAR; INTRAVENOUS AS NEEDED
Status: DISCONTINUED | OUTPATIENT
Start: 2018-08-21 | End: 2018-08-21 | Stop reason: HOSPADM

## 2018-08-21 RX ORDER — ALPRAZOLAM 0.25 MG/1
0.25 TABLET ORAL NIGHTLY PRN
Status: DISCONTINUED | OUTPATIENT
Start: 2018-08-21 | End: 2018-08-22

## 2018-08-21 RX ORDER — SODIUM CHLORIDE, SODIUM LACTATE, POTASSIUM CHLORIDE, CALCIUM CHLORIDE 600; 310; 30; 20 MG/100ML; MG/100ML; MG/100ML; MG/100ML
INJECTION, SOLUTION INTRAVENOUS CONTINUOUS
Status: DISCONTINUED | OUTPATIENT
Start: 2018-08-21 | End: 2018-08-22

## 2018-08-21 RX ORDER — HYDRALAZINE HYDROCHLORIDE 20 MG/ML
10 INJECTION INTRAMUSCULAR; INTRAVENOUS ONCE
Status: COMPLETED | OUTPATIENT
Start: 2018-08-21 | End: 2018-08-21

## 2018-08-21 RX ORDER — ONDANSETRON 2 MG/ML
4 INJECTION INTRAMUSCULAR; INTRAVENOUS ONCE AS NEEDED
Status: COMPLETED | OUTPATIENT
Start: 2018-08-21 | End: 2018-08-21

## 2018-08-21 RX ORDER — DIPHENHYDRAMINE HYDROCHLORIDE 50 MG/ML
12.5 INJECTION INTRAMUSCULAR; INTRAVENOUS EVERY 4 HOURS PRN
Status: DISCONTINUED | OUTPATIENT
Start: 2018-08-21 | End: 2018-08-22

## 2018-08-21 RX ORDER — SODIUM CHLORIDE 9 MG/ML
INJECTION, SOLUTION INTRAVENOUS CONTINUOUS
Status: DISCONTINUED | OUTPATIENT
Start: 2018-08-21 | End: 2018-08-22

## 2018-08-21 RX ORDER — NALBUPHINE HCL 10 MG/ML
2.5 AMPUL (ML) INJECTION EVERY 4 HOURS PRN
Status: DISCONTINUED | OUTPATIENT
Start: 2018-08-21 | End: 2018-08-22

## 2018-08-21 RX ORDER — DIPHENHYDRAMINE HCL 25 MG
25 CAPSULE ORAL EVERY 4 HOURS PRN
Status: DISCONTINUED | OUTPATIENT
Start: 2018-08-21 | End: 2018-08-22

## 2018-08-21 RX ORDER — BISACODYL 10 MG
10 SUPPOSITORY, RECTAL RECTAL
Status: DISCONTINUED | OUTPATIENT
Start: 2018-08-21 | End: 2018-08-22

## 2018-08-21 RX ORDER — MEPERIDINE HYDROCHLORIDE 25 MG/ML
12.5 INJECTION INTRAMUSCULAR; INTRAVENOUS; SUBCUTANEOUS AS NEEDED
Status: DISCONTINUED | OUTPATIENT
Start: 2018-08-21 | End: 2018-08-21 | Stop reason: HOSPADM

## 2018-08-21 RX ORDER — HYDROCODONE BITARTRATE AND ACETAMINOPHEN 5; 325 MG/1; MG/1
2 TABLET ORAL EVERY 4 HOURS PRN
Status: DISCONTINUED | OUTPATIENT
Start: 2018-08-21 | End: 2018-08-22

## 2018-08-21 RX ORDER — SODIUM CHLORIDE, SODIUM LACTATE, POTASSIUM CHLORIDE, CALCIUM CHLORIDE 600; 310; 30; 20 MG/100ML; MG/100ML; MG/100ML; MG/100ML
100 INJECTION, SOLUTION INTRAVENOUS CONTINUOUS
Status: DISCONTINUED | OUTPATIENT
Start: 2018-08-21 | End: 2018-08-22

## 2018-08-21 RX ORDER — ACETAMINOPHEN 325 MG/1
650 TABLET ORAL EVERY 4 HOURS PRN
Status: DISCONTINUED | OUTPATIENT
Start: 2018-08-21 | End: 2018-08-22

## 2018-08-21 RX ORDER — POLYETHYLENE GLYCOL 3350 17 G/17G
17 POWDER, FOR SOLUTION ORAL DAILY PRN
Status: DISCONTINUED | OUTPATIENT
Start: 2018-08-21 | End: 2018-08-22

## 2018-08-21 RX ORDER — CLONIDINE HYDROCHLORIDE 0.1 MG/1
0.1 TABLET ORAL DAILY PRN
COMMUNITY
End: 2019-07-26 | Stop reason: ALTCHOICE

## 2018-08-21 RX ORDER — ONDANSETRON 2 MG/ML
4 INJECTION INTRAMUSCULAR; INTRAVENOUS EVERY 6 HOURS PRN
Status: DISCONTINUED | OUTPATIENT
Start: 2018-08-21 | End: 2018-08-22

## 2018-08-21 NOTE — H&P
Neurosurgery Pre-OP H&P  2018    Rachel Up PCP:  Olga Styles MD    1970 MRN [id number]       HISTORY OF PRESENT ILLNESS:  Rachel Up is a(n) 56year old male previously seen in clinic for left leg pain, L L4-5 foraminal stenosis on MRI. round and reactive to light. EOMs intact. Face is symmetrical and sensation is intact. Tongue is midline.                     Motor: 5/5 BLE                Sensation: intact to light touch bilaterally                  Reflexes: 1+, no clonus, no campa's

## 2018-08-21 NOTE — ANESTHESIA POSTPROCEDURE EVALUATION
[facility] Patient Status:  Outpatient in a Bed   Age/Gender 56year old male MRN [id number]   [facility] SURGERY Attending Zay Jackson, [address] [facility] [address] Day # 0 PCP Lauri Antonio MD       Anesthesia Post-op Note    Procedure(

## 2018-08-21 NOTE — PROGRESS NOTES
NURSING ADMISSION NOTE      Patient admitted via Cart  Oriented to room. Safety precautions initiated. Bed in low position. Call light in reach. Patient requesting no visitors besides [facility].     Hospitalist paged for /100 awaiting call b

## 2018-08-21 NOTE — PROGRESS NOTES
Pain Service  POD2 s/p lumbar laminectomy - takes Suboxone from Dr Tyler Washington - off for 48 hours preoperatively  Pain is controlled with Dilaudid PCA 0.4/10/2.4  Norco 5/325, Zanaflex and Xanax prn (pt takes Soma at home - non-formulary)  PCA for severe pain

## 2018-08-21 NOTE — OPERATIVE REPORT
[facility]    OPERATIVE REPORT    Patient:  Karly Walker;  YOB: 1970     CSN:  [id number]; Medical Record Number:  [id number]    Admission Date:  8/21/2018 Operation Date:  8/21/2018    . ..........................     Operating Physician: foramen. Kerrison rongeurs were used to remove bone laterally, sparing the pars interarticularis and at least the lateral half of the facet joint  The nerve was then noted to be more freely mobile and laying in an anatomical position.  The nerve hook was t

## 2018-08-21 NOTE — ANESTHESIA PREPROCEDURE EVALUATION
PRE-OP EVALUATION    Patient Name: Melania Armendariz    Pre-op Diagnosis: Lumbar radiculopathy [M54.16]    Procedure(s):  Left lumbar four foraminotomy      Surgeon(s) and Role:     Jane Tripp MD - Primary    Pre-op vitals reviewed.   Temp: 98.1 °F (36.7 °C needed for Sleep. Disp:  Rfl:        Allergies: Patient has no known allergies. Anesthesia Evaluation    Patient summary reviewed.     Anesthetic Complications  (-) history of anesthetic complications         GI/Hepatic/Renal    Negative GI/hepatic/newton management plan discussed with surgeon and patient. Surgeon requests: PCA  Comment: Chronic pain management discussed.   IVPCA Dilaudid post-operative setting  Plan/risks discussed with: patient                Present on Admission:  **None**

## 2018-08-21 NOTE — BRIEF OP NOTE
Pre-Operative Diagnosis: Lumbar radiculopathy [M54.16]     Post-Operative Diagnosis: Lumbar radiculopathy [M54.16]      Procedure Performed:   Procedure(s):  Left lumbar four foraminotomy      Surgeon(s) and Role:     Khushi Mckeon MD - [facility] [address]

## 2018-08-22 VITALS
SYSTOLIC BLOOD PRESSURE: 153 MMHG | DIASTOLIC BLOOD PRESSURE: 82 MMHG | HEIGHT: 72 IN | WEIGHT: 205 LBS | BODY MASS INDEX: 27.77 KG/M2 | HEART RATE: 67 BPM | RESPIRATION RATE: 18 BRPM | OXYGEN SATURATION: 97 % | TEMPERATURE: 98 F

## 2018-08-22 PROCEDURE — 99225 SUBSEQUENT OBSERVATION CARE: CPT | Performed by: HOSPITALIST

## 2018-08-22 RX ORDER — HYDROCODONE BITARTRATE AND ACETAMINOPHEN 5; 325 MG/1; MG/1
1-2 TABLET ORAL EVERY 6 HOURS PRN
Qty: 40 TABLET | Refills: 0 | Status: SHIPPED | OUTPATIENT
Start: 2018-08-22 | End: 2019-07-26 | Stop reason: ALTCHOICE

## 2018-08-22 NOTE — CM/SW NOTE
PHQ4 score of 5. Also informed by RN that pt was discharged and his friend, Robbie Hoang, sent the transportation away- Amanda Vaz had agreed to provide transportation home prior to pt's admission. SW met with pt. Discussed PHQ4 resource.   He does f/u with [facility]

## 2018-08-22 NOTE — PLAN OF CARE
MUSCULOSKELETAL - ADULT    • Return mobility to safest level of function Progressing        PAIN - ADULT    • Verbalizes/displays adequate comfort level or patient's stated pain goal Progressing        Patient/Family Goals    • Patient/Family Short Term Go

## 2018-08-22 NOTE — CONSULTS
[facility] Pharmacy Note:  Pain Consult    Felix Andrews is a 56year old male started on Dilaudid PCA by Dr. Jasmeet Covarrubias. Pharmacy was consulted to review medication profile and to discontinue previously ordered narcotics and sedatives.     Medication profile was review

## 2018-08-22 NOTE — PHYSICAL THERAPY NOTE
PHYSICAL THERAPY QUICK EVALUATION - INPATIENT    Room Number: 381/381-A  Evaluation Date: 8/22/2018  Presenting Problem: S/p Left L4 Foraminotomy on 08/21/18  Physician Order: PT Eval and Treat    Problem List  Active Problems:    * No active hospital pr techniques;Relaxation;Repositioning (Has PCA)   RANGE OF MOTION AND STRENGTH ASSESSMENT  Upper extremity ROM and strength are within functional limits     Lower extremity ROM is within functional limits     Lower extremity strength is within functional ackerman aware of session/findings; All patient questions and concerns addressed; Discussed recommendations with /    ASSESSMENT   Patient is a 56year old male admitted on 8/21/2018 for S/p Left L4 Foraminotomy on 08/21/18.   Pertinent comorb

## 2018-08-22 NOTE — CONSULTS
[facility]  Roxana Pollack [address] Patient Status:  Observation    1970 MRN [id number]   [facility] 3SW-A Attending Robin Feliz MD   Hosp Day # 0 PCP Denis Wright MD     Reason for consult: elevated BP  Requested by: D daily.   Disp:  Rfl: 0   Carisoprodol 350 MG Oral Tab Take 350 mg by mouth 3 (three) times daily as needed. Disp:  Rfl: 1   ALPRAZolam 0.25 MG Oral Tab Take 0.25 mg by mouth nightly as needed for Sleep.  Disp:  Rfl:    Needle, Disp, (BD SAFETYGLIDE SHIELD MD  8/22/2018

## 2018-08-22 NOTE — OCCUPATIONAL THERAPY NOTE
OCCUPATIONAL THERAPY QUICK EVALUATION - INPATIENT    Room Number: 381/381-A  Evaluation Date: 8/22/2018     Type of Evaluation: Initial  Presenting Problem: Lumbar Lami    Physician Order: IP Consult to Occupational Therapy  Reason for Therapy:  ADL/IADL D extremity ROM is within functional limits     Upper extremity strength is within functional limits   No MMT performed due to spinal precautions.  Pt was grossly assessed to have strength WFL    NEUROLOGICAL FINDINGS                   ACTIVITIES OF DAILY ROBERT services needed at this time.       Patient Complexity  Occupational Profile/Medical History  LOW - Brief history including review of medical or therapy records    Specific performance deficits impacting engagement in ADL/IADL  LOW  1 - 3 performance defici

## 2018-08-24 ENCOUNTER — TELEPHONE (OUTPATIENT)
Dept: SURGERY | Facility: CLINIC | Age: 48
End: 2018-08-24

## 2018-08-24 NOTE — TELEPHONE ENCOUNTER
Paged by patient's girlfriend- Twin Loosen  Pt needs norce  He has a script but pharmacy won't fill because of suboxone  Pt reports he stopped 4 days prior to surgery on 8/22  Discussed with [facility] pharmacy at [phone]  They will fill his script

## 2019-01-08 ENCOUNTER — LAB ENCOUNTER (OUTPATIENT)
Dept: LAB | Age: 49
End: 2019-01-08
Attending: SPECIALIST
Payer: COMMERCIAL

## 2019-01-08 DIAGNOSIS — Z00.00 GENERAL MEDICAL EXAM: Primary | ICD-10-CM

## 2019-01-08 DIAGNOSIS — E34.9 TESTOSTERONE DEFICIENCY: ICD-10-CM

## 2019-01-08 LAB
ALBUMIN SERPL BCP-MCNC: 4.2 G/DL (ref 3.5–4.8)
ALBUMIN/GLOB SERPL: 1.1 {RATIO} (ref 1–2)
ALP SERPL-CCNC: 50 U/L (ref 32–100)
ALT SERPL-CCNC: 27 U/L (ref 17–63)
ANION GAP SERPL CALC-SCNC: 14 MMOL/L (ref 0–18)
AST SERPL-CCNC: 29 U/L (ref 15–41)
BASOPHILS # BLD: 0.1 K/UL (ref 0–0.2)
BASOPHILS NFR BLD: 1 %
BILIRUB SERPL-MCNC: 0.6 MG/DL (ref 0.3–1.2)
BUN SERPL-MCNC: 14 MG/DL (ref 8–20)
BUN/CREAT SERPL: 15.7 (ref 10–20)
CALCIUM SERPL-MCNC: 9.2 MG/DL (ref 8.5–10.5)
CHLORIDE SERPL-SCNC: 97 MMOL/L (ref 95–110)
CHOLEST SERPL-MCNC: 166 MG/DL (ref 110–200)
CO2 SERPL-SCNC: 26 MMOL/L (ref 22–32)
CREAT SERPL-MCNC: 0.89 MG/DL (ref 0.5–1.5)
EOSINOPHIL # BLD: 0.1 K/UL (ref 0–0.7)
EOSINOPHIL NFR BLD: 1 %
ERYTHROCYTE [DISTWIDTH] IN BLOOD BY AUTOMATED COUNT: 13.2 % (ref 11–15)
GLOBULIN PLAS-MCNC: 3.7 G/DL (ref 2.5–3.7)
GLUCOSE SERPL-MCNC: 91 MG/DL (ref 70–99)
HCT VFR BLD AUTO: 47.2 % (ref 41–52)
HDLC SERPL-MCNC: 46 MG/DL
HGB BLD-MCNC: 16 G/DL (ref 13.5–17.5)
LDLC SERPL CALC-MCNC: 102 MG/DL (ref 0–99)
LYMPHOCYTES # BLD: 1.7 K/UL (ref 1–4)
LYMPHOCYTES NFR BLD: 18 %
MCH RBC QN AUTO: 32.1 PG (ref 27–32)
MCHC RBC AUTO-ENTMCNC: 34 G/DL (ref 32–37)
MCV RBC AUTO: 94.2 FL (ref 80–100)
MONOCYTES # BLD: 0.9 K/UL (ref 0–1)
MONOCYTES NFR BLD: 10 %
NEUTROPHILS # BLD AUTO: 6.7 K/UL (ref 1.8–7.7)
NEUTROPHILS NFR BLD: 71 %
NONHDLC SERPL-MCNC: 120 MG/DL
OSMOLALITY UR CALC.SUM OF ELEC: 284 MOSM/KG (ref 275–295)
PATIENT FASTING: YES
PLATELET # BLD AUTO: 239 K/UL (ref 140–400)
PMV BLD AUTO: 9.2 FL (ref 7.4–10.3)
POTASSIUM SERPL-SCNC: 4.2 MMOL/L (ref 3.3–5.1)
PROT SERPL-MCNC: 7.9 G/DL (ref 5.9–8.4)
RBC # BLD AUTO: 5.01 M/UL (ref 4.5–5.9)
SODIUM SERPL-SCNC: 137 MMOL/L (ref 136–144)
TRIGL SERPL-MCNC: 92 MG/DL (ref 1–149)
WBC # BLD AUTO: 9.5 K/UL (ref 4–11)

## 2019-01-08 PROCEDURE — 80061 LIPID PANEL: CPT

## 2019-01-08 PROCEDURE — 84402 ASSAY OF FREE TESTOSTERONE: CPT

## 2019-01-08 PROCEDURE — 36415 COLL VENOUS BLD VENIPUNCTURE: CPT

## 2019-01-08 PROCEDURE — 85025 COMPLETE CBC W/AUTO DIFF WBC: CPT

## 2019-01-08 PROCEDURE — 84403 ASSAY OF TOTAL TESTOSTERONE: CPT

## 2019-01-08 PROCEDURE — 80053 COMPREHEN METABOLIC PANEL: CPT

## 2019-01-11 LAB
TESTOSTERONE, FREE, S: 10.2 NG/DL
TESTOSTERONE, TOTAL, S: 465 NG/DL

## 2019-05-23 ENCOUNTER — HOSPITAL ENCOUNTER (OUTPATIENT)
Age: 49
Discharge: HOME | End: 2019-05-23
Attending: EMERGENCY MEDICINE
Payer: COMMERCIAL

## 2019-05-23 VITALS
HEART RATE: 74 BPM | DIASTOLIC BLOOD PRESSURE: 69 MMHG | BODY MASS INDEX: 25.45 KG/M2 | RESPIRATION RATE: 18 BRPM | TEMPERATURE: 98 F | HEIGHT: 73 IN | SYSTOLIC BLOOD PRESSURE: 121 MMHG | OXYGEN SATURATION: 98 % | WEIGHT: 192 LBS

## 2019-05-23 DIAGNOSIS — S09.90XA CLOSED HEAD INJURY, INITIAL ENCOUNTER: Primary | ICD-10-CM

## 2019-05-23 DIAGNOSIS — S30.0XXA CONTUSION OF SACRUM, INITIAL ENCOUNTER: ICD-10-CM

## 2019-05-23 PROCEDURE — 99213 OFFICE O/P EST LOW 20 MIN: CPT

## 2019-05-23 PROCEDURE — 99203 OFFICE O/P NEW LOW 30 MIN: CPT
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
## 2019-05-23 NOTE — ED PROVIDER NOTES
Patient Seen in: [facility] In [address] [place] [address] Dewayne    History   Patient presents with:  Fall (musculoskeletal, neurologic)    Stated Complaint: fall     HPI    The patient is a 45-year-old male without significant past medical history, fell ba No    Drug use: Yes      Types: Cannabis      Comment: once a week      Review of Systems    Positive for stated complaint: fall   Other systems are as noted in HPI. Constitutional and vital signs reviewed.       All other systems reviewed and negative exc [id number]  [phone]    Go in 1 day  For Recheck        Medications Prescribed:  Discharge Medication List as of 5/23/2019  6:53 PM

## 2019-07-26 ENCOUNTER — HOSPITAL ENCOUNTER (EMERGENCY)
Facility: HOSPITAL | Age: 49
Discharge: HOME | End: 2019-07-26
Attending: EMERGENCY MEDICINE
Payer: COMMERCIAL

## 2019-07-26 ENCOUNTER — APPOINTMENT (OUTPATIENT)
Dept: GENERAL RADIOLOGY | Facility: HOSPITAL | Age: 49
End: 2019-07-26
Attending: EMERGENCY MEDICINE
Payer: COMMERCIAL

## 2019-07-26 VITALS
OXYGEN SATURATION: 99 % | SYSTOLIC BLOOD PRESSURE: 152 MMHG | RESPIRATION RATE: 18 BRPM | TEMPERATURE: 98 F | HEART RATE: 70 BPM | DIASTOLIC BLOOD PRESSURE: 96 MMHG
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
## 2019-07-26 LAB
ATRIAL RATE: 66 BPM
P AXIS: 33 DEGREES
P-R INTERVAL: 174 MS
Q-T INTERVAL: 390 MS
QRS DURATION: 112 MS
QTC CALCULATION (BEZET): 408 MS
R AXIS: 41 DEGREES
T AXIS: 21 DEGREES
VENTRICULAR RATE: 66 BPM

## 2019-07-26 PROCEDURE — 99284 EMERGENCY DEPT VISIT MOD MDM: CPT

## 2019-07-26 PROCEDURE — 93005 ELECTROCARDIOGRAM TRACING: CPT

## 2019-07-26 PROCEDURE — 99285 EMERGENCY DEPT VISIT HI MDM: CPT

## 2019-07-26 PROCEDURE — 93010 ELECTROCARDIOGRAM REPORT: CPT

## 2019-07-26 PROCEDURE — 71046 X-RAY EXAM CHEST 2 VIEWS: CPT | Performed by: EMERGENCY MEDICINE

## 2019-07-26 RX ORDER — ESCITALOPRAM OXALATE 10 MG/1
10 TABLET ORAL DAILY
COMMUNITY

## 2019-07-26 RX ORDER — CLONAZEPAM 2 MG/1
2 TABLET ORAL 2 TIMES DAILY PRN
COMMUNITY

## 2019-07-26 RX ORDER — BUPRENORPHINE AND NALOXONE 8; 2 MG/1; MG/1
1 FILM, SOLUBLE BUCCAL; SUBLINGUAL DAILY
COMMUNITY
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
## 2019-10-16 PROCEDURE — 99254 IP/OBS CNSLTJ NEW/EST MOD 60: CPT | Performed by: OTOLARYNGOLOGY

## 2022-02-15 ENCOUNTER — ORDER TRANSCRIPTION (OUTPATIENT)
Dept: PHYSICAL THERAPY | Facility: HOSPITAL | Age: 52
End: 2022-02-15

## 2022-02-17 ENCOUNTER — TELEPHONE (OUTPATIENT)
Dept: PHYSICAL THERAPY | Facility: HOSPITAL | Age: 52
End: 2022-02-17

## 2022-02-21 ENCOUNTER — OFFICE VISIT (OUTPATIENT)
Dept: PHYSICAL THERAPY | Age: 52
End: 2022-02-21
Attending: SPECIALIST
Payer: COMMERCIAL

## 2022-02-21 DIAGNOSIS — S46.012D TRAUMATIC COMPLETE TEAR OF LEFT ROTATOR CUFF, SUBSEQUENT ENCOUNTER: ICD-10-CM

## 2022-02-21 PROCEDURE — 97110 THERAPEUTIC EXERCISES: CPT

## 2022-02-21 PROCEDURE — 97161 PT EVAL LOW COMPLEX 20 MIN: CPT

## 2022-02-25 ENCOUNTER — TELEPHONE (OUTPATIENT)
Dept: PHYSICAL THERAPY | Facility: HOSPITAL | Age: 52
End: 2022-02-25

## 2022-02-25 ENCOUNTER — TELEPHONE (OUTPATIENT)
Dept: PHYSICAL THERAPY | Age: 52
End: 2022-02-25

## 2022-02-25 ENCOUNTER — APPOINTMENT (OUTPATIENT)
Dept: PHYSICAL THERAPY | Age: 52
End: 2022-02-25
Attending: SPECIALIST
Payer: COMMERCIAL

## 2022-02-28 ENCOUNTER — TELEPHONE (OUTPATIENT)
Dept: PHYSICAL THERAPY | Facility: HOSPITAL | Age: 52
End: 2022-02-28

## 2022-02-28 ENCOUNTER — APPOINTMENT (OUTPATIENT)
Dept: PHYSICAL THERAPY | Age: 52
End: 2022-02-28
Attending: SPECIALIST
Payer: COMMERCIAL

## 2022-02-28 ENCOUNTER — TELEPHONE (OUTPATIENT)
Dept: PHYSICAL THERAPY | Age: 52
End: 2022-02-28

## 2022-02-28 NOTE — TELEPHONE ENCOUNTER
No show. Called to follow up/confirm next visit. Left message    Told policy of two no show visits in a row, we take patient off our schedule and that he was a no show on 2/25/2022. Told patient therapist did not want to take him off the schedule, suggested patient call and left therapist's direct line.

## 2022-03-03 ENCOUNTER — APPOINTMENT (OUTPATIENT)
Dept: PHYSICAL THERAPY | Age: 52
End: 2022-03-03
Attending: SPECIALIST
Payer: COMMERCIAL

## 2022-03-07 ENCOUNTER — OFFICE VISIT (OUTPATIENT)
Dept: PHYSICAL THERAPY | Age: 52
End: 2022-03-07
Attending: SPECIALIST
Payer: COMMERCIAL

## 2022-03-07 DIAGNOSIS — S46.012D TRAUMATIC TEAR OF LEFT ROTATOR CUFF, UNSPECIFIED TEAR EXTENT, SUBSEQUENT ENCOUNTER: ICD-10-CM

## 2022-03-07 PROCEDURE — 97140 MANUAL THERAPY 1/> REGIONS: CPT

## 2022-03-07 PROCEDURE — 97110 THERAPEUTIC EXERCISES: CPT

## 2022-03-07 NOTE — PROGRESS NOTES
Dx: Tear of rotator cuff (M75.100)               Insurance (Authorized # of Visits):  Iraida Wetzel 150 O 2/8 visits expires 4/20/2022         Authorizing Physician: Dr. Janny Ryan  Next MD visit: none scheduled    Fall Risk: standard         Precautions: Per 2/21/2022 phone call with Dr. Velora Aschoff office no restrictions, but progress per normal protocol. Subjective: Pain at its worst 2-3/10, better, less pain. Able to raise arm higher for dressing and reaching into shelves easier. Still very weak in arm, has not lifted almost anything in 2 years. Objective:   Tested 3/8/2022:  Shoulder AROM: (* denotes performed with pain)  (L shoulder is affected side)  Abduction: full range, but upper shoulder hike at around 90 degrees, painful mid-range. External Rotation: R 80 degrees; L 30 degrees  Hand behind back: R T8; L full range, but end range pain about T8*  External rotation in 90 degrees abduction: R 90 degrees, L roughly 60 degrees*     Intact:  Shoulder AROM: (* denotes performed with pain)  (L shoulder is affected side)  Flexion: free and full bilaterally  Extension: free and full bilaterally  Horizontal adduction: R appears full; L appears full  Horizontal abduction: free and full bilaterally  Sleeper stretch standing: free and full bilaterally    Tested 2/21/2022:  Manual Muscle Tests:  Abduction pinky up: 4-/5 L*, 5/5 R   Abduction thumb up: 4-/5 L*, 5/5 R   Prone shoulder extension: 3+/5 L*, 5/5 R   Prone shoulder rhomboid: 3+/5 L*, 5/5 R   Prone shoulder middle trap: 3+/5 L*, 5/5 R   Prone shoulder lower trap: Not tested tested left, 5/5 R   Prone shoulder external rotation: 3+/5 L*, 5/5 R   Prone shoulder internal rotation: 5/5 bilaterally  [place] test: 5/5 bilaterally        Assessment: ROM improved, except for external rotation. External rotation improves after posterior glenohumeral mobilization, instructed to patient and taught to stretch into external rotation with increased elbow flexion. Taught very light program for strengthening, which is very fatiguing. Goals: (to be met in 16 visits)   Pt will improve Quick Dash 9 score from 66 to 46 to display improved ability to raise arm overhead   Pt will reduce pain at its worst from 4-5/10 to 1-2/10 to allow for improved ability to do heavy housework  Pt will horizontal abduction AROM from -9 to 20 degrees to allow for improved ability to wash his back. Pt will be independent with home program to allow for maintenance of goals achieved in therapy. Plan:  Heat with UBE for warm up 5 minutes  Mobilization  Prone T wide below 90 (and prone row) vs. scapular clock vs. sitting scapular contractions vs. scapular stuff on wall  Supine shoulder flexion for strength  Strength test serratus anterior: Serratus anterior strength Ceiling punches 2lbs (  Biceps and triceps light 2lbs 20lbs       Current HEP: Stretching 6-10x a day: external rotation stretching with elbow flexion 130 degrees and use of cane. Stretching 2x a day (flexion, extension, horizontal abduction, sleeper stretch), self-posterior glenohumeral joint glides. Prone shoulder extension 20x and isometric 6 way shoulder strength at 5% total force    Charges: 2 there ex, 1 manual therapy     Total Timed Treatment: 45 min  Total Treatment Time: 45 min  Date: 3/7/2022  Tx#: 2 Date: Tx#: 3 Date: Tx#: 4 Date: Tx#: 5 Date: Tx#: 6   Ther-Ex  External rotation stretching with 90 degrees elbow flexion and 10 degrees flexion x20: a little better, '60 sustained: about the same    External rotation stretching with 140 degrees elbow flexion and 10 degrees flexion x20: more better (3 sets done)    Prone shoulder extension 10-20x2    Isometric 6 way shoulder strength at 5% total force 10-20x2    Self-posterior shoulder glide. Educated on home program, see below. Verbal, visual and tactile cues for there ex.              Manual  Posterior glenohumeral glides: a bit better external rotation    Anterior glides: appear free and full, no effect after    Inferior glides       N Re-Ed

## 2022-03-10 ENCOUNTER — APPOINTMENT (OUTPATIENT)
Dept: PHYSICAL THERAPY | Age: 52
End: 2022-03-10
Attending: SPECIALIST
Payer: COMMERCIAL

## 2022-03-14 ENCOUNTER — TELEPHONE (OUTPATIENT)
Dept: PHYSICAL THERAPY | Age: 52
End: 2022-03-14

## 2022-03-14 ENCOUNTER — APPOINTMENT (OUTPATIENT)
Dept: PHYSICAL THERAPY | Age: 52
End: 2022-03-14
Attending: SPECIALIST
Payer: COMMERCIAL

## 2022-03-14 NOTE — TELEPHONE ENCOUNTER
No show. Called to follow up/confirm next visit. Left message    Suggest he schedule additional visits in the next week or so during preferred day (Monday's) until Mid-April.

## 2022-03-17 ENCOUNTER — APPOINTMENT (OUTPATIENT)
Dept: PHYSICAL THERAPY | Age: 52
End: 2022-03-17
Attending: SPECIALIST
Payer: COMMERCIAL

## 2022-03-28 ENCOUNTER — TELEPHONE (OUTPATIENT)
Dept: PHYSICAL THERAPY | Age: 52
End: 2022-03-28

## 2022-03-28 ENCOUNTER — APPOINTMENT (OUTPATIENT)
Dept: PHYSICAL THERAPY | Age: 52
End: 2022-03-28
Attending: SPECIALIST
Payer: COMMERCIAL

## 2022-03-28 NOTE — TELEPHONE ENCOUNTER
No show. Called to tell patient to call therapist or reach via My Chart to schedule next appointment.  Left message

## 2023-07-10 ENCOUNTER — LAB ENCOUNTER (OUTPATIENT)
Dept: LAB | Age: 53
End: 2023-07-10
Attending: SPECIALIST
Payer: COMMERCIAL

## 2023-07-10 DIAGNOSIS — Z13.1 SCREENING FOR DIABETES MELLITUS: ICD-10-CM

## 2023-07-10 DIAGNOSIS — Z00.01 ENCOUNTER FOR GENERAL ADULT MEDICAL EXAMINATION WITH ABNORMAL FINDINGS: Primary | ICD-10-CM

## 2023-07-10 DIAGNOSIS — Z12.5 SPECIAL SCREENING FOR MALIGNANT NEOPLASM OF PROSTATE: ICD-10-CM

## 2023-07-10 DIAGNOSIS — E29.1 MALE HYPOGONADISM: ICD-10-CM

## 2023-07-10 LAB
ALBUMIN SERPL-MCNC: 3.9 G/DL (ref 3.4–5)
ALBUMIN/GLOB SERPL: 1.1 {RATIO} (ref 1–2)
ALP LIVER SERPL-CCNC: 59 U/L
ALT SERPL-CCNC: 31 U/L
ANION GAP SERPL CALC-SCNC: 4 MMOL/L (ref 0–18)
AST SERPL-CCNC: 29 U/L (ref 15–37)
BASOPHILS # BLD AUTO: 0.04 X10(3) UL (ref 0–0.2)
BASOPHILS NFR BLD AUTO: 0.6 %
BILIRUB SERPL-MCNC: 0.4 MG/DL (ref 0.1–2)
BILIRUB UR QL STRIP.AUTO: NEGATIVE
BUN BLD-MCNC: 12 MG/DL (ref 7–18)
CALCIUM BLD-MCNC: 9.4 MG/DL (ref 8.5–10.1)
CHLORIDE SERPL-SCNC: 103 MMOL/L (ref 98–112)
CHOLEST SERPL-MCNC: 209 MG/DL (ref ?–200)
CLARITY UR REFRACT.AUTO: CLEAR
CO2 SERPL-SCNC: 29 MMOL/L (ref 21–32)
COLOR UR AUTO: YELLOW
COMPLEXED PSA SERPL-MCNC: 0.56 NG/ML (ref ?–4)
CREAT BLD-MCNC: 1.01 MG/DL
EOSINOPHIL # BLD AUTO: 0.3 X10(3) UL (ref 0–0.7)
EOSINOPHIL NFR BLD AUTO: 4.8 %
ERYTHROCYTE [DISTWIDTH] IN BLOOD BY AUTOMATED COUNT: 13.3 %
EST. AVERAGE GLUCOSE BLD GHB EST-MCNC: 120 MG/DL (ref 68–126)
FASTING PATIENT LIPID ANSWER: NO
FASTING STATUS PATIENT QL REPORTED: NO
GFR SERPLBLD BASED ON 1.73 SQ M-ARVRAT: 90 ML/MIN/1.73M2 (ref 60–?)
GLOBULIN PLAS-MCNC: 3.6 G/DL (ref 2.8–4.4)
GLUCOSE BLD-MCNC: 87 MG/DL (ref 70–99)
GLUCOSE UR STRIP.AUTO-MCNC: NEGATIVE MG/DL
HBA1C MFR BLD: 5.8 % (ref ?–5.7)
HCT VFR BLD AUTO: 51.8 %
HDLC SERPL-MCNC: 40 MG/DL (ref 40–59)
HGB BLD-MCNC: 16.9 G/DL
IMM GRANULOCYTES # BLD AUTO: 0.02 X10(3) UL (ref 0–1)
IMM GRANULOCYTES NFR BLD: 0.3 %
KETONES UR STRIP.AUTO-MCNC: NEGATIVE MG/DL
LDLC SERPL CALC-MCNC: 142 MG/DL (ref ?–100)
LEUKOCYTE ESTERASE UR QL STRIP.AUTO: NEGATIVE
LYMPHOCYTES # BLD AUTO: 2.99 X10(3) UL (ref 1–4)
LYMPHOCYTES NFR BLD AUTO: 48.3 %
MCH RBC QN AUTO: 31.1 PG (ref 26–34)
MCHC RBC AUTO-ENTMCNC: 32.6 G/DL (ref 31–37)
MCV RBC AUTO: 95.4 FL
MONOCYTES # BLD AUTO: 0.61 X10(3) UL (ref 0.1–1)
MONOCYTES NFR BLD AUTO: 9.9 %
NEUTROPHILS # BLD AUTO: 2.23 X10 (3) UL (ref 1.5–7.7)
NEUTROPHILS # BLD AUTO: 2.23 X10(3) UL (ref 1.5–7.7)
NEUTROPHILS NFR BLD AUTO: 36.1 %
NITRITE UR QL STRIP.AUTO: NEGATIVE
NONHDLC SERPL-MCNC: 169 MG/DL (ref ?–130)
OSMOLALITY SERPL CALC.SUM OF ELEC: 281 MOSM/KG (ref 275–295)
PH UR STRIP.AUTO: 6 [PH] (ref 5–8)
PLATELET # BLD AUTO: 294 10(3)UL (ref 150–450)
POTASSIUM SERPL-SCNC: 4.6 MMOL/L (ref 3.5–5.1)
PROT SERPL-MCNC: 7.5 G/DL (ref 6.4–8.2)
PROT UR STRIP.AUTO-MCNC: NEGATIVE MG/DL
RBC # BLD AUTO: 5.43 X10(6)UL
RBC UR QL AUTO: NEGATIVE
SODIUM SERPL-SCNC: 136 MMOL/L (ref 136–145)
SP GR UR STRIP.AUTO: 1.02 (ref 1–1.03)
TRIGL SERPL-MCNC: 148 MG/DL (ref 30–149)
TSI SER-ACNC: 1.4 MIU/ML (ref 0.36–3.74)
UROBILINOGEN UR STRIP.AUTO-MCNC: 4 MG/DL
VLDLC SERPL CALC-MCNC: 28 MG/DL (ref 0–30)
WBC # BLD AUTO: 6.2 X10(3) UL (ref 4–11)

## 2023-07-10 PROCEDURE — 81003 URINALYSIS AUTO W/O SCOPE: CPT

## 2023-07-10 PROCEDURE — 80061 LIPID PANEL: CPT

## 2023-07-10 PROCEDURE — 84403 ASSAY OF TOTAL TESTOSTERONE: CPT

## 2023-07-10 PROCEDURE — 85025 COMPLETE CBC W/AUTO DIFF WBC: CPT

## 2023-07-10 PROCEDURE — 84402 ASSAY OF FREE TESTOSTERONE: CPT

## 2023-07-10 PROCEDURE — 84443 ASSAY THYROID STIM HORMONE: CPT

## 2023-07-10 PROCEDURE — 36415 COLL VENOUS BLD VENIPUNCTURE: CPT

## 2023-07-10 PROCEDURE — 83036 HEMOGLOBIN GLYCOSYLATED A1C: CPT

## 2023-07-10 PROCEDURE — 80053 COMPREHEN METABOLIC PANEL: CPT

## 2023-07-13 LAB
FREE TESTOST DIRECT: 8 PG/ML
TESTOSTERONE: 579 NG/DL

## 2023-10-10 NOTE — OPERATIVE REPORT
[facility]  Operative Report  2018     Akanksha Roberts Patient Status:  Hospital Outpatient Surgery    1970 MRN [id number]   [facility] SURGERY Attending Tara To MD   Hosp Day # 0 PCP Tom Loo MD     Indication: Valeriano Knight Location of patient: [place]    Received call from [place] at [facility]; Patient with [employer] Complaint requesting to establish care with [facility]. Subjective: Caller states \"a piece of glass to bottom of left foot approx 1 month ago, entry wound healed over with now increase in pain and swelling\"     Current Symptoms:   -pain to left foot with swelling  -foreign body to bottom of foot  -denies new numbness, states she has diabetic neuropathy    Onset: 1 month ago; worsening    Pain Severity: denies current pain, pain with weight bearing only     Temperature: denies fevers     What has been tried:   -elevation  -soaking warm water    Recommended disposition: See in Office Today or Tomorrow    Care advice provided, patient verbalizes understanding; denies any other questions or concerns; instructed to call back for any new or worsening symptoms. Patient/Caller agrees with recommended disposition; writer provided warm transfer to [place] at [facility] for appointment scheduling if appointment unavailable patient directed for evaluation at [facility]    Attention Provider: Thank you for allowing me to participate in the care of your patient. The patient was connected to triage in response to information provided to the [facility]. Please do not respond through this encounter as the response is not directed to a shared pool. Reason for Disposition   Has diabetes (diabetes mellitus) and any bruising or wound    Protocols used:  Ankle and Foot Injury-ADULT-[place] no subjective or objective loss of motor strength. The patient was observed until discharge criteria met. Discharge instructions were given and patient was released to a responsible adult. Complications: None. Follow up:  The patient was followed in

## (undated) DIAGNOSIS — M75.100 TEAR OF ROTATOR CUFF: Primary | ICD-10-CM

## (undated) DEVICE — Device

## (undated) DEVICE — GLOVE SURG SENSICARE SZ 6-1/2

## (undated) DEVICE — BANDAID COVERLET 1X3

## (undated) DEVICE — 3M(TM) MEDIPORE(TM) +PAD SOFT CLOTH ADHESIVE WOUND DRESSING 3566: Brand: 3M™ MEDIPORE™

## (undated) DEVICE — SUTURE MONOCRYL 4-0 PS-2

## (undated) DEVICE — NEEDLE SPINAL 22X5 405148

## (undated) DEVICE — PREMIUM WET SKIN PREP TRAY: Brand: MEDLINE INDUSTRIES, INC.

## (undated) DEVICE — LIGHT HANDLE

## (undated) DEVICE — MARKER SKIN 2 TIP

## (undated) DEVICE — PAIN TRAY: Brand: MEDLINE INDUSTRIES, INC.

## (undated) DEVICE — STERILE POLYISOPRENE POWDER-FREE SURGICAL GLOVES: Brand: PROTEXIS

## (undated) DEVICE — SUTURE VICRYL 2-0 CP-2

## (undated) DEVICE — DRILL SRG OIL CRTDG MAESTRO

## (undated) DEVICE — DRAPE C-ARM UNIVERSAL

## (undated) DEVICE — REMOVER DURAPREP 3M

## (undated) DEVICE — KENDALL SCD EXPRESS SLEEVES, THIGH LENGTH, MEDIUM: Brand: KENDALL SCD

## (undated) DEVICE — 3.0MM PRECISION NEURO (MATCH HEAD)

## (undated) DEVICE — PROXIMATE RH ROTATING HEAD SKIN STAPLERS (35 WIDE) CONTAINS 35 STAINLESS STEEL STAPLES: Brand: PROXIMATE

## (undated) DEVICE — GLOVE SURG SENSICARE SZ 7-1/2

## (undated) DEVICE — LAMINECTOMY CDS: Brand: MEDLINE INDUSTRIES, INC.

## (undated) DEVICE — SOLUTION ANSEP 70% ISOPRPNL

## (undated) DEVICE — SUPER SPONGES,MEDIUM: Brand: KERLIX

## (undated) DEVICE — TRANSPOSAL ULTRAFLEX DUO/QUAD ULTRA CART MANIFOLD

## (undated) DEVICE — MARKER PRE-SURGICAL MINI DISP

## (undated) DEVICE — SUTURE VICRYL 0 CT-1

## (undated) DEVICE — SOL  .9 1000ML BTL

## (undated) DEVICE — FLOSEAL HEMOSTATIC MATRIX, 5ML: Brand: FLOSEAL HEMOSTATIC MATRIX

## (undated) NOTE — LETTER
[facility] Testing Department  Phone: [phone]  OUTSIDE TESTING RESULT REQUEST      TO:   Dr. Akanksha Conroy Date: 8/3/18    FAX #: [phone]     IMPORTANT: FOR YOUR IMMEDIATE ATTENTION  Please FAX all test results listed below to: [phone]

## (undated) NOTE — LETTER
08/02/18    Dariel Fong,   This is the pre procedure information that we spoke about over the phone. If you have any questions or concerns please call the office at [phone] option 2. [address]  PRE-PROCEDURE INSTRUCTIONS WITHOUT SED ? Xarelto (Rivaroxaban) 3 days  ? Lovenox (Enoxaparin) 24 hours  ? Aspirin  ? 81mg 24 hours  ? Greater than 81 mg (325mg) 7 days  ? Coumadin       5 days   · Procedure may be cancelled if INR is elevated. ? Excedrin (with aspirin) 7 days  ?  Plavix (Clopid PRE-PROCEDURE LINE -[phone] FOR DETAILED INSTRUCTIONS FIVE TO SEVEN DAYS PRIOR TO PROCEDURE**

## (undated) NOTE — LETTER
[facility] Testing Department  Phone: [phone]  Right Fax: [phone]  [facility] 20 By:  Amalia Calle RN Date: 18    Patient Name: Mahendra Henriquez  Surgery Date: 2018    CSN: [id number]  Medical Record: [id number]

## (undated) NOTE — LETTER
2018          RE: Elvira Sparrow     : 1970    Dear Dr. Guevara Perera,    This letter is to inform you that your patient is being scheduled for surgery with Dr. Narcisa Dc on 18 at [facility]. We have asked the patient to contact your office

## (undated) NOTE — LETTER
2018      Patient: Klaudia Wu  : 1970      To Whom it may concern: This letter has been written at the patient's request. The above patient was seen today 18 at the [facility] for treatment of a medical condition.

## (undated) NOTE — LETTER
Date & Time: 7/26/2019, 9:21 AM  Patient: Lizzeth Citizen  Encounter Provider(s): Jess Juan MD       To Whom It May Concern:    Misty Lin was seen and treated in our department on 7/26/2019.  He can return to work with these limitations: No h

## (undated) NOTE — LETTER
Date & Time: 6/20/2018, 3:57 PM  Patient: Johnathan Stauffer  Encounter Provider(s):    Nelly Tomlin MD       To Whom It May Concern:    Zack Ortega was seen and treated in our department on 6/20/2018. He may return to work on 6/23/18.     If you have any

## (undated) NOTE — LETTER
Date & Time: 5/30/2018, 12:25 PM  Patient: Akanksha Roberts  Encounter Provider(s):    David Valentin MD       To Whom It May Concern:    Becka Gamez was seen and treated in our department on 5/30/2018.  He may return to work as soon as June 1, 2018

## (undated) NOTE — LETTER
18          Zenia Myrick  :  1970      To Whom It May Concern: This patient was seen in our office on 18 . We are planning for a lumbar foraminotomy on 18.  He will require 6-8 weeks off of work to adequately recover from this an

## (undated) NOTE — ED AVS SNAPSHOT
Dorie Brucebelkys   MRN: [id number]    Department:  [facility] Emergency Department   Date of Visit:  7/26/2019           Disclosure     Insurance plans vary and the physician(s) referred by the ER may not be covered by your plan.  Please contact your in tell this physician (or your personal doctor if your instructions are to return to your personal doctor) about any new or lasting problems. The primary care or specialist physician will see patients referred from the [facility] Emergency Department.  Wing Rangel